# Patient Record
Sex: MALE | Race: WHITE
[De-identification: names, ages, dates, MRNs, and addresses within clinical notes are randomized per-mention and may not be internally consistent; named-entity substitution may affect disease eponyms.]

---

## 2023-01-26 ENCOUNTER — HOSPITAL ENCOUNTER (EMERGENCY)
Dept: HOSPITAL 97 - ER | Age: 53
LOS: 1 days | Discharge: HOME | End: 2023-01-27
Payer: COMMERCIAL

## 2023-01-26 DIAGNOSIS — I10: ICD-10-CM

## 2023-01-26 DIAGNOSIS — R00.2: Primary | ICD-10-CM

## 2023-01-26 LAB
HCT VFR BLD CALC: 38.8 % (ref 39.6–49)
LYMPHOCYTES # SPEC AUTO: 2.8 K/UL (ref 0.7–4.9)
MCV RBC: 90.6 FL (ref 80–100)
PMV BLD: 7.6 FL (ref 7.6–11.3)
RBC # BLD: 4.28 M/UL (ref 4.33–5.43)

## 2023-01-26 PROCEDURE — 36415 COLL VENOUS BLD VENIPUNCTURE: CPT

## 2023-01-26 PROCEDURE — 71045 X-RAY EXAM CHEST 1 VIEW: CPT

## 2023-01-26 PROCEDURE — 84484 ASSAY OF TROPONIN QUANT: CPT

## 2023-01-26 PROCEDURE — 99284 EMERGENCY DEPT VISIT MOD MDM: CPT

## 2023-01-26 PROCEDURE — 80048 BASIC METABOLIC PNL TOTAL CA: CPT

## 2023-01-26 PROCEDURE — 85025 COMPLETE CBC W/AUTO DIFF WBC: CPT

## 2023-01-26 PROCEDURE — 93005 ELECTROCARDIOGRAM TRACING: CPT

## 2023-01-27 VITALS — DIASTOLIC BLOOD PRESSURE: 86 MMHG | SYSTOLIC BLOOD PRESSURE: 128 MMHG | OXYGEN SATURATION: 98 %

## 2023-01-27 LAB
BUN BLD-MCNC: 13 MG/DL (ref 7–18)
GLUCOSE SERPLBLD-MCNC: 125 MG/DL (ref 74–106)
POTASSIUM SERPL-SCNC: 3.4 MMOL/L (ref 3.5–5.1)
TROPONIN I SERPL HS-MCNC: 4.9 PG/ML (ref ?–58.9)

## 2023-01-27 NOTE — RAD REPORT
EXAM DESCRIPTION:  RAD - Chest Single View - 1/26/2023 10:51 pm

 

CLINICAL HISTORY:  52 years, Male, CHEST PAIN

 

COMPARISON:  None

 

FINDINGS:  Single view of the chest was obtained portable. No prior films are available for compariso
n. The cardiomediastinal silhouette demonstrate to be unremarkable. The heart is not enlarged. The th
oracic aorta is unremarkable. The pulmonary vasculature is normal distribution. Costophrenic angles a
re sharp. Mild elevation of the right hemidiaphragm. No areas of consolidation or masses are seen.   
The rest of the soft tissue and bony structures demonstrate to be unremarkable.

 

IMPRESSION:  No acute cardiopulmonary disease seen.

 

Electronically signed by:   Juanjo Kay MD   1/26/2023 11:12 PM CST Workstation: [id number]

 

 

Due to temporary technical issues with the PACS/Fluency reporting system, reports are being signed by
 the in house radiologists without review as a courtesy to insure prompt reporting. The interpreting 
radiologist is fully responsible for the content of the report.

## 2023-01-27 NOTE — ER
Nurse's Notes                                                                                     

 [facility]                                                                 

Name: Dax Sánchez                                                                           

Age: 52 yrs                                                                                       

Sex: Male                                                                                         

: 1970                                                                                   

MRN: [id number]                                                                                   

Arrival Date: 2023                                                                          

Time: 22:22                                                                                       

Account#: [id number]                                                                            

Bed 3                                                                                             

Private MD:                                                                                       

Diagnosis: Palpitations                                                                           

                                                                                                  

Presentation:                                                                                     

                                                                                             

22:32 Chief complaint: Patient states: "Around 5 I started feeling fluttering in my chest. I  vc1 

      looked at my watch and every time I felt the fluttering something was different on the      

      EKG.". Coronavirus screen: Vaccine status: Patient reports receiving the 1st dose of        

      the Covid vaccine. unsure Client denies travel out of the [place] in the last 14 days. At      

      this time, the client does not indicate any symptoms associated with coronavirus-19.        

      Ebola Screen: No symptoms or risks identified at this time. Initial Sepsis Screen: Does     

      the patient meet any 2 criteria? No. Patient's initial sepsis screen is negative. Does      

      the patient have a suspected source of infection? No. Patient's initial sepsis screen       

      is negative. Risk Assessment: Do you want to hurt yourself or someone else? Patient         

      reports no desire to harm self or others. Onset of symptoms was 2023 at         

      17:00.                                                                                      

22:32 Method Of Arrival: Ambulatory                                                           vc1 

22:32 Acuity: GRETTA 3                                                                           vc1 

                                                                                                  

Triage Assessment:                                                                                

22:40 General: Appears in no apparent distress. uncomfortable, Behavior is calm, cooperative, vc1 

      appropriate for age. Pain: Complains of pain in anterior aspect of left upper chest.        

      EENT: No deficits noted. No signs and/or symptoms were reported regarding the EENT          

      system. Neuro: Level of Consciousness is awake, alert, obeys commands, Oriented to          

      person, place, time, situation, Appropriate for age. Cardiovascular: Reports                

      palpitations. Respiratory: Airway is patent Respiratory effort is even, unlabored,          

      Respiratory pattern is regular, symmetrical. GI: No deficits noted. No signs and/or         

      symptoms were reported involving the gastrointestinal system. : No deficits noted. No     

      signs and/or symptoms were reported regarding the genitourinary system. Derm: No            

      deficits noted. No signs and/or symptoms reported regarding the dermatologic system.        

      Musculoskeletal: No deficits noted.                                                         

                                                                                                  

Historical:                                                                                       

- Allergies:                                                                                      

22:37 No Known Allergies;                                                                     vc1 

- Home Meds:                                                                                      

22:37 lisinopril 20 mg Oral tab 1 tab once daily [Active]; amlodipine oral [Active];          vc1 

- PMHx:                                                                                           

22:37 Hypertension; palpitations;                                                             vc1 

- PSHx:                                                                                           

22:37 None;                                                                                   vc1 

                                                                                                  

- Immunization history:: Client reports receiving the 1st dose of the Covid vaccine.              

- Social history:: Smoking status: Patient denies any tobacco usage or history of.                

                                                                                                  

                                                                                                  

Screenin:53 [facility] ED Fall Risk Assessment (Adult) History of falling in the last 3 months,       kl  

      including since admission No falls in past 3 months (0 pts) Confusion or Disorientation     

      No (0 pts) Intoxicated or Sedated No (0 pts) Impaired Gait No (0 pts) Mobility Assist       

      Device Used No (0 pt) Altered Elimination No (0 pt) Score/Fall Risk Level 0 - 2 = Low       

      Risk Oriented to surroundings, Maintained a safe environment. Abuse screen: Denies          

      threats or abuse. Nutritional screening: No deficits noted. Tuberculosis screening: No      

      symptoms or risk factors identified.                                                        

                                                                                                  

Assessment:                                                                                       

22:53 General: Appears in no apparent distress. comfortable, well groomed, well developed,    kl  

      emaciated, Behavior is calm, cooperative. Pain: Complains of pain in chest and anterior     

      aspect of left upper chest Pain does not radiate. Pain began gradually.                     

                                                                                             

00:58 Reassessment: Patient appears in no apparent distress at this time. Patient and/or        

      family updated on plan of care and expected duration. Pain level reassessed. Patient is     

      alert, oriented x 3, equal unlabored respirations, skin warm/dry/pink.                      

01:21 Reassessment: Patient and/or family updated on plan of care and expected duration. Pain ll3 

      level reassessed. Patient is alert, oriented x 3, equal unlabored respirations, skin        

      warm/dry/pink. Patient denies pain at this time.                                            

                                                                                                  

Vital Signs:                                                                                      

                                                                                             

22:32  / 96; Pulse 71; Resp 15; Pulse Ox 100% ; Weight 95.25 kg; Height 6 ft. 0 in.     vc1 

      (182.88 cm); Pain 0/10;                                                                     

22:54  / 85; Pulse 69; Resp 18; Pulse Ox 98% ;                                          kl  

                                                                                             

00:57  / 87; Pulse 66; Resp 17; Pulse Ox 99% on R/A;                                    kl  

01:21  / 86; Pulse 67; Resp 18; Pulse Ox 98% on R/A;                                    ll3 

                                                                                             

22:32 Body Mass Index 28.48 (95.25 kg, 182.88 cm)                                             vc1 

                                                                                                  

ED Course:                                                                                        

                                                                                             

22:22 Patient arrived in ED.                                                                  ja2 

22:26 Marty Doll MD is Attending Physician.                                              bs3 

22:37 Triage completed.                                                                       vc1 

22:42 Arm band placed on right wrist.                                                         vc1 

22:44 Basic Metabolic Panel Sent.                                                             kl  

22:44 CBC with Diff Sent.                                                                     kl  

22:44 Troponin HS Sent.                                                                       kl  

22:53 XRAY Chest (1 view) In Process Unspecified.                                             EDMS

23:08 Missed attempt(s): 20 gauge in right antecubital area. Bleeding controlled, band aid    kl  

      applied, catheter tip intact.                                                               

                                                                                             

00:00 Inserted saline lock: 22 gauge in left antecubital area, using aseptic technique. Blood ll3 

      collected.                                                                                  

01:08 No provider procedures requiring assistance completed. Patient maintains SpO2           ll3 

      saturation greater than 95% on room air.                                                    

01:09 Patient has correct armband on for positive identification. Placed in gown. Bed in low  ll3 

      position. Call light in reach. Side rails up X 1. Client placed on continuous cardiac       

      and pulse oximetry monitoring. NIBP monitoring applied.                                     

01:20 IV discontinued, intact, bleeding controlled, No redness/swelling at site. Pressure     ll3 

      dressing applied.                                                                           

                                                                                                  

Administered Medications:                                                                         

No medications were administered                                                                  

                                                                                                  

                                                                                                  

Medication:                                                                                       

                                                                                             

22:42 VIS not applicable for this client.                                                     vc1 

                                                                                                  

Outcome:                                                                                          

                                                                                             

01:10 Discharge ordered by MD.                                                                bs3 

01:20 Discharged to home ambulatory, with significant other.                                  ll3 

01:20 Condition: stable                                                                           

01:20 Discharge instructions given to patient, significant other, Instructed on discharge         

      instructions, follow up and referral plans. medication usage, Demonstrated                  

      understanding of instructions, follow-up care, medications, Prescriptions given X 1.        

01:22 Patient left the ED.                                                                    ll3 

                                                                                                  

Signatures:                                                                                       

Dispatcher [employer]                           EDMS                                                 

Mandy Linares RN RN kl Alexander, Jessica                           ja2                                                  

Sachi Munoz RN RN   ll3                                                  

Eva Vitale RN RN   vc1                                                  

Marty Doll MD MD   bs3                                                  

                                                                                                  

Corrections: (The following items were deleted from the chart)                                    

01:09 00:54  / 87; Pulse 61bpm; Resp 16bpm; Pulse Ox 97% RA; ll3                        ll3 

                                                                                                  

**************************************************************************************************

## 2023-01-27 NOTE — EDPHYS
Physician Documentation                                                                           

 [facility]                                                                 

Name: Dax Sánchez                                                                           

Age: 52 yrs                                                                                       

Sex: Male                                                                                         

: 1970                                                                                   

MRN: [id number]                                                                                   

Arrival Date: 2023                                                                          

Time: 22:22                                                                                       

Account#: [id number]                                                                            

Bed 3                                                                                             

Private MD:                                                                                       

ED Physician Marty Doll                                                                       

HPI:                                                                                              

                                                                                             

22:39 This 52 yrs old  Male presents to ER via Ambulatory with complaints of         bs3 

      fluttering in his chest.                                                                    

22:39 52-year-old male history of an arrhythmia which he was told was benign presents with    bs3 

      fluttering in his chest intermittently today he notes that he will feel a fluttering        

      sensation intermittently he checked his apple watch and recorded a telemetry strip and      

      he noticed an abnormal beat and it has been becoming increasingly frequent and              

      therefore he came in today he denies chest pain shortness of breath nausea vomiting he      

      denies any changes in his medications denies any other symptoms he did see a                

      cardiologist previously who did an echo and a stress test and had a nondiagnostic           

      work-up. Patient denies drug use alcohol use or anything else.                              

                                                                                                  

Historical:                                                                                       

- Allergies:                                                                                      

22:37 No Known Allergies;                                                                     vc1 

- Home Meds:                                                                                      

22:37 lisinopril 20 mg Oral tab 1 tab once daily [Active]; amlodipine oral [Active];          vc1 

- PMHx:                                                                                           

22:37 Hypertension; palpitations;                                                             vc1 

- PSHx:                                                                                           

22:37 None;                                                                                   vc1 

                                                                                                  

- Immunization history:: Client reports receiving the 1st dose of the Covid vaccine.              

- Social history:: Smoking status: Patient denies any tobacco usage or history of.                

                                                                                                  

                                                                                                  

ROS:                                                                                              

22:41 Constitutional: Negative for fever, chills Respiratory: Negative for shortness of       bs3 

      breath, cough, wheezing Abdomen/GI: Negative for abdominal pain, nausea, vomiting,          

      diarrhea                                                                                    

                                                                                                  

Exam:                                                                                             

22:41 Constitutional:  This is a well developed, well nourished patient who is awake, alert,  bs3 

      and in no acute distress. Head/Face:  Normocephalic, atraumatic. Eyes:  Pupils equal        

      round and reactive to light, extra-ocular motions intact.  Lids and lashes normal.          

      ENT:  mmm, no posterior phyarngeal erythema Neck:  Trachea midline, no thyromegaly, no      

      neck stiffness Chest/axilla:  Normal chest wall appearance and motion.  Nontender with      

      no deformity.  No lesions are appreciated. Cardiovascular:  Regular rate and rhythm         

      with a normal S1 and S2.  symmetric pulses in upper extremities Respiratory:  Lungs         

      have equal breath sounds bilaterally, clear to auscultation, no respiratory distress        

      Abdomen/GI:  Soft, non-tender, no rebound or guarding                                       

22:41 Normal sinus rhythm at 70 no ST elevations or depressions  no arrhythmia as read     

      by myself                                                                                   

                                                                                                  

Vital Signs:                                                                                      

22:32  / 96; Pulse 71; Resp 15; Pulse Ox 100% ; Weight 95.25 kg; Height 6 ft. 0 in.     vc1 

      (182.88 cm); Pain 0/10;                                                                     

22:54  / 85; Pulse 69; Resp 18; Pulse Ox 98% ;                                          kl  

                                                                                             

00:57  / 87; Pulse 66; Resp 17; Pulse Ox 99% on R/A;                                    kl  

01:21  / 86; Pulse 67; Resp 18; Pulse Ox 98% on R/A;                                    ll3 

                                                                                             

22:32 Body Mass Index 28.48 (95.25 kg, 182.88 cm)                                             vc1 

                                                                                                  

MDM:                                                                                              

                                                                                             

22:26 Patient medically screened.                                                             bs3 

22:41 Differential diagnosis: abnormal EKG, acute myocardial infarction, acute pericarditis,  bs3 

      coronary artery disease chest wall pain, Arrhythmia. Data reviewed: vital signs, nurses     

      notes. Consideration of Admission/Observation. ED course: I reviewed patient's apple        

      watch strip it appears that he is having atrial premature contractions we will monitor.     

                                                                                             

01:07 Independent interpretation of the following test(s) in the Emergency Department X-Ray:  bs3 

      My interpretation is no acute cardiopulm disease as read by myself. . Rhythm Strip          

      Interpretation Rate: 76 nsr, 1 pvcBPM. ED course: pt remained stable here labs neg for      

      acute abnormalities, he did have occasional pvc which were symptomatic to the patient,      

      discussed options w patient, advised to dc caffeine, trial of metoprolol if symptoms        

      persist, advised f/u with cardiology.                                                       

                                                                                                  

                                                                                             

22:38 Order name: Basic Metabolic Panel; Complete Time: 00:31                                 3 

                                                                                             

22:38 Order name: CBC with Diff; Complete Time: 00:16                                         bs3 

                                                                                             

22:38 Order name: Troponin HS; Complete Time: 00:31                                           3 

                                                                                             

22:38 Order name: XRAY Chest (1 view)                                                         bs3 

                                                                                             

22:38 Order name: EKG; Complete Time: 22:38                                                   bs3 

                                                                                             

22:38 Order name: Cardiac monitoring; Complete Time: 22:43                                    bs3 

                                                                                             

22:38 Order name: EKG - Nurse/Tech; Complete Time: 22:58                                      bs3 

                                                                                             

22:38 Order name: IV Saline Lock; Complete Time: 22:58                                        bs3 

                                                                                             

22:38 Order name: Labs collected and sent; Complete Time: 22:58                               bs3 

                                                                                             

22:38 Order name: O2 Per Protocol; Complete Time: 22:44                                       bs3 

                                                                                             

22:38 Order name: O2 Sat Monitoring; Complete Time: 22:44                                     bs3 

                                                                                                  

Administered Medications:                                                                         

No medications were administered                                                                  

                                                                                                  

                                                                                                  

Disposition Summary:                                                                              

23 01:10                                                                                    

Discharge Ordered                                                                                 

      Location: Home                                                                          bs3 

      Problem: new                                                                            bs3 

      Symptoms: have improved                                                                 bs3 

      Condition: Stable                                                                       bs3 

      Diagnosis                                                                                   

        - Palpitations                                                                        bs3 

      Followup:                                                                               bs3 

        - With: Private Physician                                                                  

        - When: 5 - 6 days                                                                         

        - Reason: Recheck today's complaints                                                       

      Discharge Instructions:                                                                     

        - Discharge Summary Sheet                                                             bs3 

        - Palpitations                                                                        bs3 

      Forms:                                                                                      

        - Medication Reconciliation Form                                                      bs3 

        - Thank You Letter                                                                    bs3 

        - Antibiotic Education                                                                bs3 

        - Prescription Opioid Use                                                             bs3 

      Prescriptions:                                                                              

        - Toprol XL 25 mg Oral Tablet                                                              

            - take 1 tablet by ORAL route once daily; 20 tablet; Refills: 0, Product          bs3 

      Selection Permitted                                                                         

Signatures:                                                                                       

Dispatcher [employer]                           Eva Sánchez RN                    RN   vc1                                                  

Marty Doll MD MD   bs3                                                  

                                                                                                  

Corrections: (The following items were deleted from the chart)                                    

                                                                                             

22:41 22:39 52-year-old male history of an arrhythmia which he was told was benign presents   bs3 

      with fluttering in his chest intermittently today he notes that he will feel a              

      fluttering sensation intermittently he checked his apple watch and recorded a telemetry     

      strip and he noticed an abnormal beat and it has been becoming increasingly frequent        

      and therefore he came in today he denies chest pain shortness of breath nausea vomiting     

      he denies any changes in his medications denies any other symptoms he did see a             

      cardiologist previously who did an echo and a stress test and had a nondiagnostic           

      work-up. bs3                                                                                

                                                                                                  

**************************************************************************************************

## 2023-01-27 NOTE — EKG
Test Date:    2023-01-26               Test Time:    22:28:07

Technician:   LEANA                                     

                                                     

MEASUREMENT RESULTS:                                       

Intervals:                                           

Rate:         70                                     

OK:           136                                    

QRSD:         102                                    

QT:           388                                    

QTc:          419                                    

Axis:                                                

P:            43                                     

OK:           136                                    

QRS:          57                                     

T:            29                                     

                                                     

INTERPRETIVE STATEMENTS:                                       

                                                     

Normal sinus rhythm

Normal ECG

Compared to ECG 08/16/2016 12:25:36

Left ventricular hypertrophy no longer present



Electronically Signed On 01-27-23 13:14:31 CST by Kaveh Lynn